# Patient Record
Sex: MALE | URBAN - NONMETROPOLITAN AREA
[De-identification: names, ages, dates, MRNs, and addresses within clinical notes are randomized per-mention and may not be internally consistent; named-entity substitution may affect disease eponyms.]

---

## 2019-04-30 ENCOUNTER — NURSE TRIAGE (OUTPATIENT)
Dept: ADMINISTRATIVE | Age: 34
End: 2019-04-30

## 2019-04-30 NOTE — TELEPHONE ENCOUNTER
Message from Mohinder Lowry sent at 4/30/2019  6:52 PM EDT     Summary: Stuck needle thru finger    Stuck needle thru finger - what should he do              \"I was giving a cow a shot of antibiotic and she moved and the needle when through my finger. \"    Reason for Disposition   [1] Last tetanus shot > 10 years ago AND [2] clean puncture (needle AND skin were clean)    Answer Assessment - Initial Assessment Questions  1. DEVICE: \"What punctured the skin? \"  (e.g., hollow injection needle, suture needle, knife blade, razor)      Stuck with clean needle not sure if already in cow or not,  Cow moved and he stuck himself and needle went through the tip of L middle finger  2. LOCATION: \"Where is the puncture located? \"  (e.g., finger)      L middle finger  3. DEPTH: \"How deep do you think the puncture goes? \"  (e.g., superficial)      When clear through fine  4. ONSET: \"When did the injury occur? \" (e.g., minutes, hours, days)        About 4 pm  5. HOW OCCURRED: \"How did this happen? \"      Giving shot to cow  6. SOURCE BODY FLUID: \"What body fluid were you exposed to?\" (e.g., blood, spinal fluid, none)       no  7. SOURCE HIV-HEPATITIS: \"Does the SOURCE person have Hepatitis or HIV? \" (e.g., no; unknown; HIV+, Hepatitis+)      no  8. HEPATITIS B VACCINE: \"Have you been fully vaccinated for Hepatitis B? \"      no  9. TETANUS VACCINE: \"Have you been fully vaccinated for tetanus? \" \"When was your last tetanus booster? \"      Not sure last one  10. PREGNANCY: \"Is there any chance you are pregnant? \" \"When was your last menstrual period? \"        male    Protocols used: NEEDLESTICK-ADULT-